# Patient Record
(demographics unavailable — no encounter records)

---

## 2025-01-30 NOTE — HISTORY OF PRESENT ILLNESS
[FreeTextEntry1] : 86-year-old female who remains very active including playing tennis with a history of hypertension for which she takes irbesartan and hyperlipidemia on Zetia presents for her yearly physical.  The patient has a thoracic ascending aortic aneurysm for which she had a followup 2018 with slightly increased in size.  Most recently she had a CT angiogram November 2023 with ascending thoracic aorta at 4.4 cm which is stable. Coronary calcium CAT scan score December 2021 with minimal plaque in the LAD at 11.  Despite minimal plaque patient has significantly increased cholesterol therefore cardiology restarted her statin with rosuvastatin 5 mg daily with patient intolerance secondary to myalgias.  She is now on Repatha for hyperlipidemia  She complains of a few months of dyspnea on exertion with no associated edema, PND or orthopnea.  She has seen cardiology January 2024 having had an echocardiogram showing normal LVEF with mild LVH with diastolic dysfunction and no significant valvular issues.  Holter monitor also done  Dyspnea on exertion is not worsening but has continued.  No associated chest pain, palpitations  The patient's main issue is long history of recurrent vertigo with persistent symptoms. She has done balance therapy with some benefit but no resolution. She has been through this a number of times in the past and has had extensive workups. She was told by a specialist that there is nothing more they can do for her. she uses meclizine almost on a daily basis with benefit. Also significant hearing loss with hearing aids  Patient is status post fall June 2023 with resultant back pain with x-ray showing age-indeterminate compression fractures of T4 and T11.  Back pain has improved.  Patient with known osteoporosis declining treatment.  The patient also has a history of a left ovarian cyst for which she has followed up with gynecology which is stable.  The pt lives with her son in her home along with her 4 grandchildren living with her.This has been for a few years now and initially was very stressful but now the patient has gotten used to it and she doesn't want her son to leave.

## 2025-01-30 NOTE — HEALTH RISK ASSESSMENT
[Audit-CScore] : 0 [[id number]] : 0 [Change in mental status noted] : No change in mental status noted [Sexually Active] : not sexually active [Reports changes in vision] : Reports no changes in vision [Reports changes in dental health] : Reports no changes in dental health [FreeTextEntry2] :  [de-identified] : deficiets with aids

## 2025-01-30 NOTE — ASSESSMENT
[FreeTextEntry1] : 86-year-old female currently medically stable well-controlled hypertension on valsartan and hyperlipidemia on Repatha with patient statin intolerant. The patient encouraged to continue present medications along with dieting and exercise.  Patient with dyspnea on exertion currently seeing cardiology with evaluation and patient told if cardiology feels that the heart is not the cause of her dyspnea on exertion then we will have her see pulmonary.  Coronary calcium CAT scan December 2021 with minimal increase score but severe hyperlipidemia but intolerant to rosuvastatin 5 mg daily.  Therefore cholesterol currently not being treated.  Ascending aortic aneurysm stable on chest CTA November 2023 at 4.4 cm.  Patient told to follow-up with cardiology which she states she may or may not.  She is unsure if she wants any further testing.  The patient's main issue presently is persistent vertigo which has improved somewhat at the balance therapy but continues.Patient using meclizine daily would benefit. Unfortunately her vertigo was exacerbated by recent episode of influenza which has been slow to improve but now 80% better. Patient is to continue what she is doing and call if symptoms worsen.  Status post fall June 2023 with back pain which has improved with x-ray showing age-indeterminate compression fractures of T4 and T11.  Patient with osteoporosis with treatment discussed which patient declines.  Occasional nocturnal leg cramps recommended continuing banana daily and doing Achilles stretches  Colonoscopy August 2023 Mammography March 2019 with followup right mammography/sonogram in 6 months. Patient declines followup mammography.  She does get breast exams via GYN Bone density September 2017.  Declines follow-up GYN evaluation yearly  high-dose influenza vaccine already received Vaccines up-to-date including COVID  Follow-up in 3 months

## 2025-05-07 NOTE — HISTORY OF PRESENT ILLNESS
[FreeTextEntry1] : Pt presents for followup on hypertension/hyperlipidemia/prediabetes. Patient is currently fasting for today's labs . Patient is currently on Diovan for hypertension, on Repatha for hyperlipidemia and is trying to control/improve her sugar dietarily. -No new issues/status quo

## 2025-05-07 NOTE — ASSESSMENT
[FreeTextEntry1] : Venipuncture done in our office, Labs sent out/further recommendations will be made based on lab results.Patient is to continue present medications with diet/exercise and specialist followup.Patient will return to the office in 4months   vaccines are UTD/ +got covid vaccines Bone density was 9/17=declines followup Mammogram 1/2019-six-month right mammogram and sonogram=declines any followup MG Colonoscopy was 8/2023 specialists include 1.ophthalmology+in [place] 2. GYN-Dr. Nevaeh Riley 3. GI-Dr. Guzman 4.cardiothoracic= Dr. Martin-[facility] 5.ENT p.r.n.-Dr. Fernandez 6.Cardio-Dr. Monahan 7.Podiatry prn-Dr. Acosta 8.Neuro-Dr. Padron echo via cardio CTA of the chest 11/2023 CT CA score=11=2/2022 no Indication for hepatitis C screen.

## 2025-06-03 NOTE — ASSESSMENT
[FreeTextEntry1] : Venipuncture done in our office, Labs sent out/further recommendations will be made based on lab results.Patient is to continue present medications with diet/exercise and specialist followup.Patient will return to the office  as sched for reg check   vaccines are UTD/ +got covid vaccines Bone density was 9/17=declines followup Mammogram 1/2019-six-month right mammogram and sonogram=declines any followup MG Colonoscopy was 8/2023 specialists include 1.ophthalmology+in [place] 2. GYN-Dr. Nevaeh Riley 3. GI-Dr. Guzman 4.cardiothoracic= Dr. Martin-[facility] 5.ENT p.r.n.-Dr. Fernandez 6.Cardio-Dr. Monahan 7.Podiatry prn-Dr. Acosta 8.Neuro-Dr. Padron echo via cardio CTA of the chest 11/2023 CT CA score=11=2/2022 no Indication for hepatitis C screen.

## 2025-06-03 NOTE — HISTORY OF PRESENT ILLNESS
[FreeTextEntry1] : Pt presents for followup -Patient is currently on Diovan for hypertension -last labs showed CA of 10.9, abnormal results d/w pt and questions answered

## 2025-06-03 NOTE — ASSESSMENT
[FreeTextEntry1] : Venipuncture done in our office, Labs sent out/further recommendations will be made based on lab results.Patient is to continue present medications with diet/exercise and specialist followup.Patient will return to the office  as sched for reg check   vaccines are UTD/ +got covid vaccines Bone density was 9/17=declines followup Mammogram 1/2019-six-month right mammogram and sonogram=declines any followup MG Colonoscopy was 8/2023 specialists include 1.ophthalmology+in [place] 2. GYN-Dr. Nevaeh Riley 3. GI-Dr. Guzman 4.cardiothoracic= Dr. Martin[facility] 5.ENT p.r.n.-Dr. Fernandez 6.Cardio-Dr. Monahan 7.Podiatry prn-Dr. Acosta 8.Neuro-Dr. Padron echo via cardio CTA of the chest 11/2023 CT CA score=11=2/2022 no Indication for hepatitis C screen.